# Patient Record
Sex: MALE | Race: WHITE | Employment: FULL TIME | URBAN - METROPOLITAN AREA
[De-identification: names, ages, dates, MRNs, and addresses within clinical notes are randomized per-mention and may not be internally consistent; named-entity substitution may affect disease eponyms.]

---

## 2017-08-01 ENCOUNTER — APPOINTMENT (OUTPATIENT)
Dept: GENERAL RADIOLOGY | Age: 42
DRG: 918 | End: 2017-08-01
Attending: EMERGENCY MEDICINE
Payer: COMMERCIAL

## 2017-08-01 ENCOUNTER — HOSPITAL ENCOUNTER (OUTPATIENT)
Age: 42
Discharge: LEFT BEFORE DISCHARGE | DRG: 918 | End: 2017-08-01
Attending: EMERGENCY MEDICINE | Admitting: EMERGENCY MEDICINE
Payer: COMMERCIAL

## 2017-08-01 VITALS
BODY MASS INDEX: 30.59 KG/M2 | OXYGEN SATURATION: 100 % | HEIGHT: 71 IN | RESPIRATION RATE: 18 BRPM | WEIGHT: 218.5 LBS | TEMPERATURE: 98.4 F | SYSTOLIC BLOOD PRESSURE: 116 MMHG | HEART RATE: 100 BPM | DIASTOLIC BLOOD PRESSURE: 89 MMHG

## 2017-08-01 DIAGNOSIS — R00.0 TACHYCARDIA: ICD-10-CM

## 2017-08-01 DIAGNOSIS — N19 RENAL FAILURE: ICD-10-CM

## 2017-08-01 DIAGNOSIS — T40.604A: Primary | ICD-10-CM

## 2017-08-01 PROBLEM — T40.601A OVERDOSE OF OPIATE OR RELATED NARCOTIC (HCC): Status: ACTIVE | Noted: 2017-08-01

## 2017-08-01 LAB
ALBUMIN SERPL BCP-MCNC: 4.4 G/DL (ref 3.4–5)
ALBUMIN/GLOB SERPL: 0.9 {RATIO} (ref 0.8–1.7)
ALP SERPL-CCNC: 56 U/L (ref 45–117)
ALT SERPL-CCNC: 74 U/L (ref 16–61)
AMPHET UR QL SCN: NEGATIVE
ANION GAP BLD CALC-SCNC: 9 MMOL/L (ref 3–18)
APAP SERPL-MCNC: <2 UG/ML (ref 10–30)
APPEARANCE UR: CLEAR
AST SERPL W P-5'-P-CCNC: 43 U/L (ref 15–37)
ATRIAL RATE: 133 BPM
ATRIAL RATE: 144 BPM
BACTERIA URNS QL MICRO: ABNORMAL /HPF
BARBITURATES UR QL SCN: NEGATIVE
BASOPHILS # BLD AUTO: 0 K/UL (ref 0–0.1)
BASOPHILS # BLD: 0 % (ref 0–2)
BENZODIAZ UR QL: POSITIVE
BILIRUB SERPL-MCNC: 0.5 MG/DL (ref 0.2–1)
BILIRUB UR QL: NEGATIVE
BUN SERPL-MCNC: 13 MG/DL (ref 7–18)
BUN/CREAT SERPL: 8 (ref 12–20)
CALCIUM SERPL-MCNC: 9.1 MG/DL (ref 8.5–10.1)
CALCULATED P AXIS, ECG09: 46 DEGREES
CALCULATED R AXIS, ECG10: -31 DEGREES
CALCULATED R AXIS, ECG10: -61 DEGREES
CALCULATED T AXIS, ECG11: 27 DEGREES
CALCULATED T AXIS, ECG11: 63 DEGREES
CANNABINOIDS UR QL SCN: POSITIVE
CHLORIDE SERPL-SCNC: 103 MMOL/L (ref 100–108)
CO2 SERPL-SCNC: 27 MMOL/L (ref 21–32)
COCAINE UR QL SCN: NEGATIVE
COLOR UR: YELLOW
CREAT SERPL-MCNC: 1.63 MG/DL (ref 0.6–1.3)
DIAGNOSIS, 93000: NORMAL
DIAGNOSIS, 93000: NORMAL
DIFFERENTIAL METHOD BLD: ABNORMAL
EOSINOPHIL # BLD: 0.1 K/UL (ref 0–0.4)
EOSINOPHIL NFR BLD: 1 % (ref 0–5)
EPITH CASTS URNS QL MICRO: ABNORMAL /LPF (ref 0–5)
ERYTHROCYTE [DISTWIDTH] IN BLOOD BY AUTOMATED COUNT: 12.8 % (ref 11.6–14.5)
ETHANOL SERPL-MCNC: 89 MG/DL (ref 0–3)
GLOBULIN SER CALC-MCNC: 4.7 G/DL (ref 2–4)
GLUCOSE SERPL-MCNC: 138 MG/DL (ref 74–99)
GLUCOSE UR STRIP.AUTO-MCNC: NEGATIVE MG/DL
HCT VFR BLD AUTO: 46.2 % (ref 36–48)
HDSCOM,HDSCOM: ABNORMAL
HGB BLD-MCNC: 15.7 G/DL (ref 13–16)
HGB UR QL STRIP: ABNORMAL
KETONES UR QL STRIP.AUTO: NEGATIVE MG/DL
LACTATE BLD-SCNC: 2.4 MMOL/L (ref 0.4–2)
LEUKOCYTE ESTERASE UR QL STRIP.AUTO: NEGATIVE
LYMPHOCYTES # BLD AUTO: 29 % (ref 21–52)
LYMPHOCYTES # BLD: 3.8 K/UL (ref 0.9–3.6)
MCH RBC QN AUTO: 28.6 PG (ref 24–34)
MCHC RBC AUTO-ENTMCNC: 34 G/DL (ref 31–37)
MCV RBC AUTO: 84.3 FL (ref 74–97)
METHADONE UR QL: NEGATIVE
MONOCYTES # BLD: 0.5 K/UL (ref 0.05–1.2)
MONOCYTES NFR BLD AUTO: 4 % (ref 3–10)
NEUTS SEG # BLD: 8.5 K/UL (ref 1.8–8)
NEUTS SEG NFR BLD AUTO: 66 % (ref 40–73)
NITRITE UR QL STRIP.AUTO: NEGATIVE
OPIATES UR QL: POSITIVE
P-R INTERVAL, ECG05: 146 MS
PCP UR QL: NEGATIVE
PH UR STRIP: 7 [PH] (ref 5–8)
PLATELET # BLD AUTO: 269 K/UL (ref 135–420)
PMV BLD AUTO: 9.7 FL (ref 9.2–11.8)
POTASSIUM SERPL-SCNC: 4 MMOL/L (ref 3.5–5.5)
PROT SERPL-MCNC: 9.1 G/DL (ref 6.4–8.2)
PROT UR STRIP-MCNC: 30 MG/DL
Q-T INTERVAL, ECG07: 294 MS
Q-T INTERVAL, ECG07: 312 MS
QRS DURATION, ECG06: 88 MS
QRS DURATION, ECG06: 98 MS
QTC CALCULATION (BEZET), ECG08: 437 MS
QTC CALCULATION (BEZET), ECG08: 527 MS
RBC # BLD AUTO: 5.48 M/UL (ref 4.7–5.5)
RBC #/AREA URNS HPF: ABNORMAL /HPF (ref 0–5)
SALICYLATES SERPL-MCNC: <2.8 MG/DL (ref 2.8–20)
SODIUM SERPL-SCNC: 139 MMOL/L (ref 136–145)
SP GR UR REFRACTOMETRY: 1.01 (ref 1–1.03)
TROPONIN I SERPL-MCNC: <0.02 NG/ML (ref 0–0.04)
UROBILINOGEN UR QL STRIP.AUTO: 0.2 EU/DL (ref 0.2–1)
VENTRICULAR RATE, ECG03: 133 BPM
VENTRICULAR RATE, ECG03: 172 BPM
WBC # BLD AUTO: 12.9 K/UL (ref 4.6–13.2)
WBC URNS QL MICRO: NEGATIVE /HPF (ref 0–4)

## 2017-08-01 PROCEDURE — 96361 HYDRATE IV INFUSION ADD-ON: CPT

## 2017-08-01 PROCEDURE — 96374 THER/PROPH/DIAG INJ IV PUSH: CPT

## 2017-08-01 PROCEDURE — 81001 URINALYSIS AUTO W/SCOPE: CPT | Performed by: EMERGENCY MEDICINE

## 2017-08-01 PROCEDURE — 93005 ELECTROCARDIOGRAM TRACING: CPT

## 2017-08-01 PROCEDURE — 65270000029 HC RM PRIVATE

## 2017-08-01 PROCEDURE — 80307 DRUG TEST PRSMV CHEM ANLYZR: CPT | Performed by: EMERGENCY MEDICINE

## 2017-08-01 PROCEDURE — 71010 XR CHEST PORT: CPT

## 2017-08-01 PROCEDURE — 83605 ASSAY OF LACTIC ACID: CPT

## 2017-08-01 PROCEDURE — 85025 COMPLETE CBC W/AUTO DIFF WBC: CPT | Performed by: EMERGENCY MEDICINE

## 2017-08-01 PROCEDURE — 99285 EMERGENCY DEPT VISIT HI MDM: CPT

## 2017-08-01 PROCEDURE — 80053 COMPREHEN METABOLIC PANEL: CPT | Performed by: EMERGENCY MEDICINE

## 2017-08-01 PROCEDURE — 74011250636 HC RX REV CODE- 250/636: Performed by: EMERGENCY MEDICINE

## 2017-08-01 PROCEDURE — 84484 ASSAY OF TROPONIN QUANT: CPT | Performed by: EMERGENCY MEDICINE

## 2017-08-01 RX ORDER — NALOXONE HYDROCHLORIDE 1 MG/ML
INJECTION INTRAMUSCULAR; INTRAVENOUS; SUBCUTANEOUS
Status: DISCONTINUED
Start: 2017-08-01 | End: 2017-08-02 | Stop reason: HOSPADM

## 2017-08-01 RX ORDER — NALOXONE HYDROCHLORIDE 1 MG/ML
0.5 INJECTION INTRAMUSCULAR; INTRAVENOUS; SUBCUTANEOUS ONCE
Status: DISCONTINUED | OUTPATIENT
Start: 2017-08-01 | End: 2017-08-02 | Stop reason: HOSPADM

## 2017-08-01 RX ORDER — NALOXONE HYDROCHLORIDE 1 MG/ML
0.5 INJECTION INTRAMUSCULAR; INTRAVENOUS; SUBCUTANEOUS ONCE
Status: COMPLETED | OUTPATIENT
Start: 2017-08-01 | End: 2017-08-01

## 2017-08-01 RX ADMIN — SODIUM CHLORIDE 1000 ML: 900 INJECTION, SOLUTION INTRAVENOUS at 16:52

## 2017-08-01 RX ADMIN — NALOXONE HYDROCHLORIDE 0.5 MG: 1 INJECTION PARENTERAL at 15:40

## 2017-08-01 RX ADMIN — SODIUM CHLORIDE 1000 ML: 900 INJECTION, SOLUTION INTRAVENOUS at 15:52

## 2017-08-01 NOTE — ED NOTES
Bedside shift change report given to Tin Smallwood RN (oncoming nurse) by Etienne Gonzales RN (offgoing nurse). Report included the following information SBAR, ED Summary, Intake/Output, MAR, Recent Results and Cardiac Rhythm Sinus tach.

## 2017-08-01 NOTE — Clinical Note
Status[de-identified] Inpatient [101] Type of Bed: Medical [8] Inpatient Hospitalization Certified Necessary for the Following Reasons: 3. Patient receiving treatment that can only be provided in an inpatient setting (further clarification in H&P documentation) Admitting Diagnosis: Overdose of opiate or related narcotic [[id number]] Admitting Diagnosis: Renal failure [[id number]] Admitting Diagnosis: Tachycardia [[id number]] Admitting Physician: Chen Shi [[id number]] Attending Physician: Chen Shi [[id number]] Estimated Length of Stay: 2 Midnights Discharge Plan[de-identified] Home with Office Follow-up

## 2017-08-01 NOTE — LETTER
NOTIFICATION RETURN TO WORK / SCHOOL 
 
8/1/2017 9:09 PM 
 
Mr. Issa Jeffrey 
[address] [facility]. [address] [address] To Whom It May Concern: 
 
Issa Jeffrey is currently under the care of [facility]. He will return to work/school on: Aug 3, 2017 If there are questions or concerns please have the patient contact our office. Sincerely, No name on file.

## 2017-08-01 NOTE — ED PROVIDER NOTES
HPI Comments: 3:33 PM Carlyle Hendricks is a 37 y.o. male who presents to ED via EMS for evaluation after being found unresponsive in the 's seat of a vehicle. Per EMS the pt was seen by witnesses driving erratically in a [employer] parking lot. They states that they had to break the window to get to him and police had to forcefully remove him from the car because he became agitated. He admits to EtOH at lunch today, but denies illicit drug use. No other complaints, associated symptoms or modifying factors at this time. PCP: Arianna Gerardo., DO    The history is provided by the patient. No past medical history on file. No past surgical history on file. No family history on file. Social History     Social History    Marital status: SINGLE     Spouse name: N/A    Number of children: N/A    Years of education: N/A     Occupational History    Not on file. Social History Main Topics    Smoking status: Not on file    Smokeless tobacco: Not on file    Alcohol use Not on file    Drug use: Not on file    Sexual activity: Not on file     Other Topics Concern    Not on file     Social History Narrative         ALLERGIES: Review of patient's allergies indicates not on file. Review of Systems   Constitutional: Negative for chills, diaphoresis and fatigue. HENT: Negative for congestion, rhinorrhea and sore throat. Eyes: Negative for discharge and visual disturbance. Respiratory: Negative for cough and shortness of breath. Cardiovascular: Negative for chest pain and leg swelling. Gastrointestinal: Negative for abdominal pain, blood in stool, diarrhea, nausea and vomiting. Genitourinary: Negative for discharge, dysuria and hematuria. Musculoskeletal: Negative for arthralgias, myalgias and neck pain. Skin: Negative for rash. Neurological: Negative for dizziness, weakness, numbness and headaches. Psychiatric/Behavioral: Negative for confusion.        Vitals: 08/01/17 1533 08/01/17 1535   BP: (!) 164/132 (!) 164/132   Pulse: (!) 187 (!) 192   Resp: 30 30   Temp:  (!) 100.6 °F (38.1 °C)   SpO2: 96% 98%   Weight:  99.1 kg (218 lb 8 oz)   Height:  5' 11\" (1.803 m)            Physical Exam   Constitutional: Vital signs are normal. He appears well-developed and well-nourished. He does not appear ill. No distress. HENT:   Head: Atraumatic. Mouth/Throat: Uvula is midline, oropharynx is clear and moist and mucous membranes are normal. Mucous membranes are not dry. No trismus in the jaw. Normal dentition. Eyes: Pupils are equal, round, and reactive to light. Right conjunctiva is not injected. Left conjunctiva is not injected. No scleral icterus. Right eye exhibits normal extraocular motion and no nystagmus. Left eye exhibits normal extraocular motion and no nystagmus. No clonus   Neck: Trachea normal, full passive range of motion without pain and phonation normal. Neck supple. No tracheal deviation present. Cardiovascular: Regular rhythm, S1 normal, S2 normal and normal pulses. No extrasystoles are present. Tachycardia present. No murmur heard. Pulses:       Radial pulses are 2+ on the right side, and 2+ on the left side. Prominent PMI   Pulmonary/Chest: No accessory muscle usage or stridor. No tachypnea. No respiratory distress. He has no decreased breath sounds. He has no wheezes. He has no rhonchi. He has no rales. Abdominal: Soft. He exhibits no distension and no ascites. There is no hepatosplenomegaly. There is no tenderness. There is no rebound, no guarding and no CVA tenderness. Musculoskeletal: He exhibits no edema or deformity. No rigidity to muscles  Moves all extremities   Lymphadenopathy:     He has no cervical adenopathy. Neurological: He is alert. No cranial nerve deficit.    Initially unresponsive with intermittent periods of apnea, but responsive to voice  GCS on arrival was 13, improved to 15 after administration of Narcan  Fully oriented after Narcan   Skin: Skin is warm. No rash noted. He is diaphoretic. No cyanosis. No pallor. Psychiatric: He has a normal mood and affect. He is not actively hallucinating. Thought content is not paranoid and not delusional. Cognition and memory are not impaired. MDM  Number of Diagnoses or Management Options  Diagnosis management comments: Mr. Higinio Woo is a 37year old male who presents to the ED via EMS for decreased responsiveness. Was reportedly found inside of his car in a parking lot and the window to the car needed to be broken open. No naloxone given prior to arrival. In the ED was initially somnolent and required 0.5 mg IV naloxone for improvement in mental status. He adamantly denies any ingestions. Notable that on arrival he was tachycardic to around 190 - initial EKG was unclear if he was in SVT or sinus tachycardia, but repeat after IV fluids initiated showed heart rate of 150 in sinus tachycardia. Lactate slightly elevated to 2.4. Slight temp elevation to 100.6F. No clear infectious source noted. No signs of trauma. Patient was originally diaphoretic and but had no clonus, nystagmus, or lead pipe rigidity. Admitted to drinking alcohol today at lunch. No clear evidence of TCA overdose, serotonic syndrome, or malignant hyperthermia. Considered amphetamine or cocaine ingestion. Most likely has a mixed ingestion with opiates and some other unidentified substance. May require admission for observation. Considered administration of benzodiazepines for his tachycardia and and agitation but am holding off due to his initial somnolence. Doubt he is septic. No evidence of trauma. ED Course       Procedures       Vitals:  Patient Vitals for the past 12 hrs:   Temp Pulse Resp BP SpO2   08/01/17 1535 (!) 100.6 °F (38.1 °C) (!) 192 30 (!) 164/132 98 %   08/01/17 1533 - (!) 187 30 (!) 164/132 96 %   Patient is 98% O2 on RA, indicating adequate oxygenation.        Medications ordered:   Medications   naloxone (NARCAN) 1 mg/mL injection (not administered)   naloxone (NARCAN) injection 0.5 mg (not administered)   naloxone (NARCAN) injection 0.5 mg (not administered)   sodium chloride 0.9 % bolus infusion 1,000 mL (not administered)         Lab findings:  Labs Reviewed   CBC WITH AUTOMATED DIFF - Abnormal; Notable for the following:        Result Value    ABS. NEUTROPHILS 8.5 (*)     ABS.  LYMPHOCYTES 3.8 (*)     All other components within normal limits   METABOLIC PANEL, COMPREHENSIVE - Abnormal; Notable for the following:     Glucose 138 (*)     Creatinine 1.63 (*)     BUN/Creatinine ratio 8 (*)     GFR est AA 57 (*)     GFR est non-AA 47 (*)     ALT (SGPT) 74 (*)     AST (SGOT) 43 (*)     Protein, total 9.1 (*)     Globulin 4.7 (*)     All other components within normal limits   URINALYSIS W/ RFLX MICROSCOPIC - Abnormal; Notable for the following:     Protein 30 (*)     Blood TRACE (*)     All other components within normal limits   DRUG SCREEN, URINE - Abnormal; Notable for the following:     BENZODIAZEPINE POSITIVE (*)     THC (TH-CANNABINOL) POSITIVE (*)     OPIATES POSITIVE (*)     All other components within normal limits   ACETAMINOPHEN - Abnormal; Notable for the following:     Acetaminophen level <2 (*)     All other components within normal limits   ETHYL ALCOHOL - Abnormal; Notable for the following:     ALCOHOL(ETHYL),SERUM 89 (*)     All other components within normal limits   SALICYLATE - Abnormal; Notable for the following:     SALICYLATE <4.5 (*)     All other components within normal limits   URINE MICROSCOPIC ONLY - Abnormal; Notable for the following:     Bacteria FEW (*)     All other components within normal limits   POC LACTIC ACID - Abnormal; Notable for the following:     Lactic Acid (POC) 2.4 (*)     All other components within normal limits   TROPONIN I   HEPATIC FUNCTION PANEL   POC VENOUS BLOOD GAS       EKG interpretation by ED Physician:  [id number]: Rate of 172; LAD; No ST elevation or depression; Normal QRS; Due to artifact unable to tell if SVT or sinus tachycardia is underlying rhythm    1658: Sinus tachycardia, Rate 108; LAD; Normal intervals; Normal QRS and ST segments      X-Ray, CT or other radiology findings or impressions:  XR CHEST PORT      As read by myself: No acute process. No cardiomegaly. No infiltrates. No pulmonary edema. Progress notes, Consult notes or additional Procedure notes:   Re-evaluated patient. HR improved after first bolus of normal saline. Still tachycardic. No longer diaphoretic. Will bolus 2nd liter of fluid and reassess but probably requires admit given his persistent abnormal vital signs    6:16 PM Consult:  Discussed care with Dr. Darshana Velasquez, Hospitalist. Standard discussion; including history of patients chief complaint, available diagnostic results, and treatment course. He agrees to admit. 6:45 PM Pt rechecked. Discussed the need and reasons for admission with the pt. He would like to be discharged home if possible. I asked if the person who is coming to pick him up will come inside and have a conversation with me about him leaving AMA. Discussed the risks of leaving AMA with the pt, including death, serious illness, and disability. He acknowledges and still wishes to go home. He will call his spouse to give him a ride and I will speak with her about the risks when she arrives. Pt is currently awake, alert, in no distress, heart rate 125 bpm, and no respiratory distress. Pt states that all he remembers is dropping a friend off at work and his car window cracking. He reports that he is much improved from when he arrived. Disposition:  Diagnosis:   1. Overdose of opiate or related narcotic, undetermined intent, initial encounter    2. Tachycardia    3.  Renal failure        Disposition: DAMARISA    Spoke with patient and his significant other in person - reviewed his medical course, diagnosis, and pertinent vital signs (tachycardia, low grade fever), positive urine drug screen results, elevated creatinine level, and detectable alcohol level. Discussed risks of discharge including risk of death, worsening medical condition, and disability. Patient expressed understanding. Reiterated recommendation for admission to identify his underlying medical condition. Patient continues to refuse admission. Significant other present in the ED and will drive him home. Advised that he see a provider tomorrow for re-assessment. Encouraged to come back to this ED or any other emergency department or urgent care. Provided patient with paperwork for how to establish primary care. Scribe Attestation:   Jayesh Samaniego acting as a scribe for and in the presence of Swapna Gaines MD August 01, 2017 at 3:40 PM     Signed by: Corie Amin, August 01, 2017 at 3:40 PM     Provider Attestation:   I personally performed the services described in the documentation, reviewed the documentation, as recorded by the scribe in my presence, and it accurately and completely records my words and actions.      Reviewed and signed by:  Swapna Gaines MD

## 2017-08-01 NOTE — ED TRIAGE NOTES
Pt arrives via EMS in police custody after being found unresponsive at [facility]. EMS report that pt was parked parallel in the middle of the parking lot and was not responsive after EMS broke window to vehicle. Pt drowsy and minimally responsive upon arrival. Pt tachycardiac at 180. MD is at the bedside.

## 2017-08-01 NOTE — PROGRESS NOTES
SPoke with the patient. He does not wish to be admitted at this time. Spoke with the ED physician regarding it. Please call with any questions or if patient changes his mind.

## 2017-08-02 LAB
ATRIAL RATE: 108 BPM
CALCULATED P AXIS, ECG09: 65 DEGREES
CALCULATED R AXIS, ECG10: -33 DEGREES
CALCULATED T AXIS, ECG11: 38 DEGREES
DIAGNOSIS, 93000: NORMAL
P-R INTERVAL, ECG05: 146 MS
Q-T INTERVAL, ECG07: 310 MS
QRS DURATION, ECG06: 98 MS
QTC CALCULATION (BEZET), ECG08: 415 MS
VENTRICULAR RATE, ECG03: 108 BPM

## 2017-08-02 NOTE — DISCHARGE INSTRUCTIONS
Accidental Overdose of Medicine: Care Instructions  Your Care Instructions  Almost any medicine can cause harm if you take too much of it. You have had treatment to help your body get rid of an overdose of a medicine. This may have been an over-the-counter medicine. Or it might be one that a doctor prescribed. It may even have been a vitamin or a supplement. During treatment, the doctor may have given you fluids and medicine. You also may have had lab tests. Then the doctor made sure that you were well enough to go home. The doctor has checked you carefully, but problems can develop later. If you notice any problems or new symptoms, get medical treatment right away. Follow-up care is a key part of your treatment and safety. Be sure to make and go to all appointments, and call your doctor if you are having problems. It's also a good idea to know your test results and keep a list of the medicines you take. How can you care for yourself at home? Home care  · Drink plenty of fluids. If you have kidney, heart, or liver disease and have to limit fluids, talk with your doctor before you increase the amount of fluids you drink. · If you normally take medicines, ask your doctor when you can start taking them again. · Read the information that comes with any medicine. If you have questions, ask your doctor or pharmacist.  Prevention  · Be safe with medicines. Take your medicines exactly as prescribed or as the label directs. Call your doctor if you think you are having a problem with your medicine. · Keep your medicines in the containers they came in. Keep them with the original labels. · Find out what to do if you miss a dose of your medicine. When should you call for help? Call 911 anytime you think you may need emergency care. For example, call if:  · You passed out (lost consciousness). · You have trouble breathing. · You are sleepy or hard to wake up.   Call your doctor now or seek immediate medical care if:  · You are vomiting. · You have a new or worse headache. · You are dizzy or lightheaded or feel like you may faint. Watch closely for changes in your health, and be sure to contact your doctor if:  · You do not get better as expected. Where can you learn more? Go to http://alhaji-lashanda.info/. Enter C165 in the search box to learn more about \"Accidental Overdose of Medicine: Care Instructions. \"  Current as of: March 24, 2017  Content Version: 11.3  © 4650-2035 [employer]. Care instructions adapted under license by [employer] (which disclaims liability or warranty for this information). If you have questions about a medical condition or this instruction, always ask your healthcare professional. [address] any warranty or liability for your use of this information. Alcohol, Drug, or Poison Ingestion: Care Instructions  Your Care Instructions  A person can become very sick, or die, from swallowing or using alcohol, drugs, or poisons. Alcohol poisoning occurs when a person drinks a large amount of alcohol. Alcohol can stop nerve signals that control breathing. It can also stop the gag reflex that prevents choking. Alcohol poisoning is serious. It can lead to brain damage or death if it's not treated right away. Drugs can be used by accident or on purpose. They can be swallowed, inhaled, injected, or absorbed through the skin. Drugs include over-the-counter medicine (such as aspirin or acetaminophen) and prescription medicine. They also include vitamins and supplements. And they include illegal drugs such as cocaine and heroin. And poisons are all around us. They include household , cosmetics, houseplants, and garden chemicals. The doctor has checked you carefully, but problems can develop later. If you notice any problems or new symptoms, get medical treatment right away.   Follow-up care is a key part of your treatment and safety. Be sure to make and go to all appointments, and call your doctor if you are having problems. It's also a good idea to know your test results and keep a list of the medicines you take. How can you care for yourself at home? Alcohol problems  · Talk to your doctor or counselor about programs that can help you stop using alcohol. · Plan ways to avoid being tempted to drink. ¨ Get rid of all alcohol in your home. ¨ Avoid places where you tend to drink. ¨ Stay away from places or events that offer alcohol. ¨ Stay away from people who drink a lot. Drug problems  · Talk to your doctor about programs that can help you stop using drugs. · Get rid of any drugs you might be tempted to misuse. · Learn how to say no when other people use drugs. · Don't spend time with people who use drugs. Poison prevention  · Keep products in the containers they came in. Keep them with the original labels. · Be careful when you use cleaning products, paints, solvents, and pesticides. Read labels before use. Use a fan to move strong odors and fumes out of your home. · Do not mix cleaning products. Try to use nontoxic . These include vinegar, lemon juice, and baking soda. When should you call for help? Poison control centers, hospitals, or your doctor can give immediate advice in the case of a poisoning. The [facility] number is [phone]. Have the poison container with you so you can give complete information to the poison control center, such as what the poison or substance is, how much was taken and when. Do not try to make the person vomit. Call 911 anytime you think you may need emergency care. For example, call if you or someone else:  · Has used or currently uses alcohol or drugs and is very confused or can't stay awake. · Has passed out (lost consciousness). · Has severe trouble breathing. · Is having a seizure.   Call your doctor now or seek immediate medical care if you or someone else:  · Has new symptoms, or is not acting normally. Watch closely for changes in your health, and be sure to contact your doctor if:  · You do not get better as expected. · You need help with drug or alcohol problems. · You have problems with depression or other mental health issues. Where can you learn more? Go to http://alhaji-lashanda.info/. Enter J796 in the search box to learn more about \"Alcohol, Drug, or Poison Ingestion: Care Instructions. \"  Current as of: March 20, 2017  Content Version: 11.3  © 8619-5467 [employer]. Care instructions adapted under license by [employer] (which disclaims liability or warranty for this information). If you have questions about a medical condition or this instruction, always ask your healthcare professional. [address] any warranty or liability for your use of this information.

## 2018-08-07 ENCOUNTER — HOSPITAL ENCOUNTER (OUTPATIENT)
Dept: PHYSICAL THERAPY | Age: 43
End: 2018-08-07

## 2018-08-09 ENCOUNTER — APPOINTMENT (OUTPATIENT)
Dept: PHYSICAL THERAPY | Age: 43
End: 2018-08-09

## 2019-07-07 ENCOUNTER — APPOINTMENT (OUTPATIENT)
Dept: CT IMAGING | Age: 44
End: 2019-07-07
Attending: EMERGENCY MEDICINE
Payer: COMMERCIAL

## 2019-07-07 ENCOUNTER — APPOINTMENT (OUTPATIENT)
Dept: GENERAL RADIOLOGY | Age: 44
End: 2019-07-07
Attending: EMERGENCY MEDICINE
Payer: COMMERCIAL

## 2019-07-07 ENCOUNTER — HOSPITAL ENCOUNTER (EMERGENCY)
Age: 44
Discharge: HOME | End: 2019-07-07
Attending: EMERGENCY MEDICINE
Payer: COMMERCIAL

## 2019-07-07 VITALS
RESPIRATION RATE: 18 BRPM | OXYGEN SATURATION: 100 % | HEART RATE: 125 BPM | SYSTOLIC BLOOD PRESSURE: 158 MMHG | DIASTOLIC BLOOD PRESSURE: 91 MMHG | TEMPERATURE: 101.2 F | BODY MASS INDEX: 30.52 KG/M2 | WEIGHT: 218 LBS | HEIGHT: 71 IN

## 2019-07-07 DIAGNOSIS — F11.10 OPIATE ABUSE, EPISODIC (HCC): Primary | ICD-10-CM

## 2019-07-07 LAB
AMPHET UR QL SCN: NEGATIVE
ANION GAP SERPL CALC-SCNC: 7 MMOL/L (ref 3–18)
APPEARANCE UR: ABNORMAL
BACTERIA URNS QL MICRO: ABNORMAL /HPF
BARBITURATES UR QL SCN: NEGATIVE
BASOPHILS # BLD: 0 K/UL (ref 0–0.1)
BASOPHILS NFR BLD: 0 % (ref 0–2)
BENZODIAZ UR QL: NEGATIVE
BILIRUB UR QL: NEGATIVE
BUN SERPL-MCNC: 22 MG/DL (ref 7–18)
BUN/CREAT SERPL: 10 (ref 12–20)
CALCIUM SERPL-MCNC: 9.5 MG/DL (ref 8.5–10.1)
CANNABINOIDS UR QL SCN: POSITIVE
CAOX CRY URNS QL MICRO: ABNORMAL
CHLORIDE SERPL-SCNC: 109 MMOL/L (ref 100–108)
CK MB CFR SERPL CALC: 0.5 % (ref 0–4)
CK MB SERPL-MCNC: 3.9 NG/ML (ref 5–25)
CK SERPL-CCNC: 803 U/L (ref 39–308)
CO2 SERPL-SCNC: 27 MMOL/L (ref 21–32)
COCAINE UR QL SCN: NEGATIVE
COLOR UR: ABNORMAL
CREAT SERPL-MCNC: 2.19 MG/DL (ref 0.6–1.3)
DIFFERENTIAL METHOD BLD: NORMAL
EOSINOPHIL # BLD: 0.1 K/UL (ref 0–0.4)
EOSINOPHIL NFR BLD: 1 % (ref 0–5)
EPITH CASTS URNS QL MICRO: ABNORMAL /LPF (ref 0–5)
ERYTHROCYTE [DISTWIDTH] IN BLOOD BY AUTOMATED COUNT: 12.9 % (ref 11.6–14.5)
ETHANOL SERPL-MCNC: <3 MG/DL (ref 0–3)
GLUCOSE SERPL-MCNC: 174 MG/DL (ref 74–99)
GLUCOSE UR STRIP.AUTO-MCNC: NEGATIVE MG/DL
HCT VFR BLD AUTO: 43.6 % (ref 36–48)
HDSCOM,HDSCOM: ABNORMAL
HGB BLD-MCNC: 15.2 G/DL (ref 13–16)
HGB UR QL STRIP: ABNORMAL
HYALINE CASTS URNS QL MICRO: ABNORMAL /LPF (ref 0–2)
KETONES UR QL STRIP.AUTO: ABNORMAL MG/DL
LACTATE BLD-SCNC: 3.14 MMOL/L (ref 0.4–2)
LEUKOCYTE ESTERASE UR QL STRIP.AUTO: NEGATIVE
LYMPHOCYTES # BLD: 2.6 K/UL (ref 0.9–3.6)
LYMPHOCYTES NFR BLD: 32 % (ref 21–52)
MCH RBC QN AUTO: 29.7 PG (ref 24–34)
MCHC RBC AUTO-ENTMCNC: 34.9 G/DL (ref 31–37)
MCV RBC AUTO: 85.2 FL (ref 74–97)
METHADONE UR QL: NEGATIVE
MONOCYTES # BLD: 0.4 K/UL (ref 0.05–1.2)
MONOCYTES NFR BLD: 4 % (ref 3–10)
MUCOUS THREADS URNS QL MICRO: ABNORMAL /LPF
NEUTS SEG # BLD: 5.2 K/UL (ref 1.8–8)
NEUTS SEG NFR BLD: 63 % (ref 40–73)
NITRITE UR QL STRIP.AUTO: NEGATIVE
OPIATES UR QL: POSITIVE
PCP UR QL: NEGATIVE
PH UR STRIP: 5.5 [PH] (ref 5–8)
PLATELET # BLD AUTO: 285 K/UL (ref 135–420)
PMV BLD AUTO: 9.5 FL (ref 9.2–11.8)
POTASSIUM SERPL-SCNC: 3.9 MMOL/L (ref 3.5–5.5)
PROT UR STRIP-MCNC: 100 MG/DL
RBC # BLD AUTO: 5.12 M/UL (ref 4.7–5.5)
RBC #/AREA URNS HPF: ABNORMAL /HPF (ref 0–5)
SODIUM SERPL-SCNC: 143 MMOL/L (ref 136–145)
SP GR UR REFRACTOMETRY: >1.03 (ref 1–1.03)
TROPONIN I SERPL-MCNC: <0.02 NG/ML (ref 0–0.04)
UROBILINOGEN UR QL STRIP.AUTO: 1 EU/DL (ref 0.2–1)
WBC # BLD AUTO: 8.2 K/UL (ref 4.6–13.2)
WBC URNS QL MICRO: ABNORMAL /HPF (ref 0–4)

## 2019-07-07 PROCEDURE — 81001 URINALYSIS AUTO W/SCOPE: CPT

## 2019-07-07 PROCEDURE — 70450 CT HEAD/BRAIN W/O DYE: CPT

## 2019-07-07 PROCEDURE — 80307 DRUG TEST PRSMV CHEM ANLYZR: CPT

## 2019-07-07 PROCEDURE — 99285 EMERGENCY DEPT VISIT HI MDM: CPT

## 2019-07-07 PROCEDURE — 96375 TX/PRO/DX INJ NEW DRUG ADDON: CPT

## 2019-07-07 PROCEDURE — 83605 ASSAY OF LACTIC ACID: CPT

## 2019-07-07 PROCEDURE — 93005 ELECTROCARDIOGRAM TRACING: CPT

## 2019-07-07 PROCEDURE — 85025 COMPLETE CBC W/AUTO DIFF WBC: CPT

## 2019-07-07 PROCEDURE — 80048 BASIC METABOLIC PNL TOTAL CA: CPT

## 2019-07-07 PROCEDURE — 87040 BLOOD CULTURE FOR BACTERIA: CPT

## 2019-07-07 PROCEDURE — 74011000250 HC RX REV CODE- 250: Performed by: EMERGENCY MEDICINE

## 2019-07-07 PROCEDURE — 71045 X-RAY EXAM CHEST 1 VIEW: CPT

## 2019-07-07 PROCEDURE — 96365 THER/PROPH/DIAG IV INF INIT: CPT

## 2019-07-07 PROCEDURE — 74011250636 HC RX REV CODE- 250/636: Performed by: EMERGENCY MEDICINE

## 2019-07-07 PROCEDURE — 82550 ASSAY OF CK (CPK): CPT

## 2019-07-07 RX ORDER — SODIUM CHLORIDE 0.9 % (FLUSH) 0.9 %
5-10 SYRINGE (ML) INJECTION AS NEEDED
Status: DISCONTINUED | OUTPATIENT
Start: 2019-07-07 | End: 2019-07-07 | Stop reason: HOSPADM

## 2019-07-07 RX ORDER — NALOXONE HYDROCHLORIDE 0.4 MG/ML
0.4 INJECTION, SOLUTION INTRAMUSCULAR; INTRAVENOUS; SUBCUTANEOUS ONCE
Status: COMPLETED | OUTPATIENT
Start: 2019-07-07 | End: 2019-07-07

## 2019-07-07 RX ORDER — VANCOMYCIN/0.9 % SOD CHLORIDE 1 G/100 ML
1000 PLASTIC BAG, INJECTION (ML) INTRAVENOUS ONCE
Status: DISCONTINUED | OUTPATIENT
Start: 2019-07-07 | End: 2019-07-07

## 2019-07-07 RX ADMIN — SODIUM CHLORIDE 1000 ML: 900 INJECTION, SOLUTION INTRAVENOUS at 16:17

## 2019-07-07 RX ADMIN — CEFTRIAXONE SODIUM 1 G: 1 INJECTION, POWDER, FOR SOLUTION INTRAMUSCULAR; INTRAVENOUS at 16:10

## 2019-07-07 RX ADMIN — VANCOMYCIN HYDROCHLORIDE 1000 MG: 10 INJECTION, POWDER, LYOPHILIZED, FOR SOLUTION INTRAVENOUS at 16:22

## 2019-07-07 RX ADMIN — NALXONE HYDROCHLORIDE 0.4 MG: 0.4 INJECTION INTRAMUSCULAR; INTRAVENOUS; SUBCUTANEOUS at 16:06

## 2019-07-07 NOTE — DISCHARGE INSTRUCTIONS
Patient Education        Misuse of Multiple Drugs: Care Instructions  Your Care Instructions    You have had treatment to help your body get rid of a combination of any of these drugs:  · Prescription medicines  · Over-the-counter medicines  · Alcohol  · Illegal drugs  Taking some drugs together may cause a bad reaction. They can have unexpected or stronger effects on your body and mind. For example, benzodiazepines (such as alprazolam and lorazepam) and alcohol both depress the nervous system. Taken together, each one is stronger than when it is taken by itself. You are getting better, but it takes time for the drugs to leave your body. It may take up to 2 weeks for your mind to clear and your mood to improve. Depending on the drugs you took, the doctor might have:  · Watched your symptoms or done tests to find out what drugs were in your body. · Treated you to control your breathing, blood pressure, and heart rate. · Tried to remove the drugs from your body by pumping your stomach or giving you a substance by mouth that absorbs chemicals. · Given you a substance that neutralizes chemicals (antidote). · Given you oxygen to help you breathe. · Given you fluids. The doctor also watched you carefully to make sure you were recovering safely. Follow-up care is a key part of your treatment and safety. Be sure to make and go to all appointments, and call your doctor if you are having problems. It's also a good idea to know your test results and keep a list of the medicines you take. How can you care for yourself at home? · When you take substances like alcohol and some drugs regularly, your body gets used to them. This is called dependency. If you are dependent on them, you may have withdrawal symptoms when you stop taking them. These symptoms may include trembling, feeling restless, and sweating. To help get past these:  ? Get plenty of rest.  ? Drink lots of fluids. ? Stay active.   ? Eat a healthy diet.  · If you had a tube in your throat to help you breathe, you may have a sore throat or hoarseness that can last a few days. Drinking fluids may help soothe your throat. Get help to stop using drugs. Talk to your doctor about drug and alcohol treatment programs. When should you call for help? Call 911 anytime you think you may need emergency care. For example, call if:    · You feel you cannot stop from hurting yourself or someone else.   [facility] your doctor now or seek immediate medical care if:    · You have new or worse symptoms of withdrawal, such as trembling, feeling restless, and sweating, that you can't manage at home.    Watch closely for changes in your health, and be sure to contact your doctor if:    · You do not get better as expected.     · You need help finding the right place to get help with drug or alcohol problems. Where can you learn more? Go to http://alhaji-lashanda.info/. Enter B109 in the search box to learn more about \"Misuse of Multiple Drugs: Care Instructions. \"  Current as of: May 7, 2018  Content Version: 11.9  © 0937-1057 [employer]. Care instructions adapted under license by [employer] (which disclaims liability or warranty for this information). If you have questions about a medical condition or this instruction, always ask your healthcare professional. [address] any warranty or liability for your use of this information.

## 2019-07-07 NOTE — ED PROVIDER NOTES
EMERGENCY DEPARTMENT HISTORY AND PHYSICAL EXAM    4:03 PM      Date: 7/7/2019  Patient Name: Chula Sharma    History of Presenting Illness     Chief Complaint   Patient presents with    Altered mental status         History Provided By: EMS    Additional History (Context): Chula Sharma is a 44 y.o. male with No significant past medical history who presents with via EMS diaphoretic and wandering around the parking lot. Patient very paranoid and anxious, restless. Patient denies taking any recreational drug. Patient denies any past medical history denies any smoking occasional alcohol and no drug use. PCP: None      Current Facility-Administered Medications   Medication Dose Route Frequency Provider Last Rate Last Dose    sodium chloride (NS) flush 5-10 mL  5-10 mL IntraVENous PRN Nadege Lockwood DO        cefTRIAXone (ROCEPHIN) 1 g in sterile water (preservative free) 10 mL IV syringe  1 g IntraVENous Q24H Michael Lockwood DO   1 g at 07/07/19 1610    sodium chloride 0.9 % bolus infusion 1,000 mL  1,000 mL IntraVENous ONCE Michael Lockwood DO        Followed by   Ayaan Bansal sodium chloride 0.9 % bolus infusion 970 mL  970 mL IntraVENous ONCE Nadege Lockwood DO           Past History     Past Medical History:  No past medical history on file. Past Surgical History:  No past surgical history on file. Family History:  No family history on file. Social History:  Social History     Tobacco Use    Smoking status: Not on file   Substance Use Topics    Alcohol use: Not on file    Drug use: Not on file       Allergies:  No Known Allergies      Review of Systems       Review of Systems   Unable to perform ROS: Acuity of condition         Physical Exam     Visit Vitals  BP (!) 158/91   Pulse (!) 125   Temp (!) 101.2 °F (38.4 °C)   Resp 18   Ht 5' 11\" (1.803 m)   Wt 98.9 kg (218 lb)   SpO2 100%   BMI 30.40 kg/m²         Physical Exam   Constitutional: He appears well-developed and well-nourished.  He is cooperative. He appears ill. He appears distressed. HENT:   Head: Normocephalic and atraumatic. Eyes: Conjunctivae are normal.   Pupils pinpoint nonreactive   Neck: Trachea normal. Neck supple. Cardiovascular: Regular rhythm. Tachycardia present. Exam reveals no gallop. No murmur heard. Pulmonary/Chest: Effort normal and breath sounds normal. No accessory muscle usage. No respiratory distress. Abdominal: Soft. Bowel sounds are normal.   Musculoskeletal: He exhibits no edema or tenderness. Strength 5/5 throughout. Neurological: He is alert. He displays atrophy. He exhibits abnormal muscle tone. Coordination abnormal. Gait normal. GCS eye subscore is 4. GCS verbal subscore is 4. GCS motor subscore is 6. Skin: He is diaphoretic. Nursing note and vitals reviewed. Diagnostic Study Results     Labs -  Recent Results (from the past 12 hour(s))   CBC WITH AUTOMATED DIFF    Collection Time: 07/07/19  3:30 PM   Result Value Ref Range    WBC 8.2 4.6 - 13.2 K/uL    RBC 5.12 4.70 - 5.50 M/uL    HGB 15.2 13.0 - 16.0 g/dL    HCT 43.6 36.0 - 48.0 %    MCV 85.2 74.0 - 97.0 FL    MCH 29.7 24.0 - 34.0 PG    MCHC 34.9 31.0 - 37.0 g/dL    RDW 12.9 11.6 - 14.5 %    PLATELET 050 605 - 734 K/uL    MPV 9.5 9.2 - 11.8 FL    NEUTROPHILS 63 40 - 73 %    LYMPHOCYTES 32 21 - 52 %    MONOCYTES 4 3 - 10 %    EOSINOPHILS 1 0 - 5 %    BASOPHILS 0 0 - 2 %    ABS. NEUTROPHILS 5.2 1.8 - 8.0 K/UL    ABS. LYMPHOCYTES 2.6 0.9 - 3.6 K/UL    ABS. MONOCYTES 0.4 0.05 - 1.2 K/UL    ABS. EOSINOPHILS 0.1 0.0 - 0.4 K/UL    ABS.  BASOPHILS 0.0 0.0 - 0.1 K/UL    DF AUTOMATED     METABOLIC PANEL, BASIC    Collection Time: 07/07/19  3:30 PM   Result Value Ref Range    Sodium 143 136 - 145 mmol/L    Potassium 3.9 3.5 - 5.5 mmol/L    Chloride 109 (H) 100 - 108 mmol/L    CO2 27 21 - 32 mmol/L    Anion gap 7 3.0 - 18 mmol/L    Glucose 174 (H) 74 - 99 mg/dL    BUN 22 (H) 7.0 - 18 MG/DL    Creatinine 2.19 (H) 0.6 - 1.3 MG/DL    BUN/Creatinine ratio 10 (L) 12 - 20      GFR est AA 40 (L) >60 ml/min/1.73m2    GFR est non-AA 33 (L) >60 ml/min/1.73m2    Calcium 9.5 8.5 - 10.1 MG/DL   CARDIAC PANEL,(CK, CKMB & TROPONIN)    Collection Time: 07/07/19  3:30 PM   Result Value Ref Range     (H) 39 - 308 U/L    CK - MB 3.9 (H) <3.6 ng/ml    CK-MB Index 0.5 0.0 - 4.0 %    Troponin-I, QT <0.02 0.0 - 0.045 NG/ML   ETHYL ALCOHOL    Collection Time: 07/07/19  3:30 PM   Result Value Ref Range    ALCOHOL(ETHYL),SERUM <3 0 - 3 MG/DL   POC LACTIC ACID    Collection Time: 07/07/19  3:44 PM   Result Value Ref Range    Lactic Acid (POC) 3.14 (HH) 0.40 - 2.00 mmol/L   EKG, 12 LEAD, INITIAL    Collection Time: 07/07/19  3:55 PM   Result Value Ref Range    Ventricular Rate 133 BPM    Atrial Rate 133 BPM    P-R Interval 144 ms    QRS Duration 98 ms    Q-T Interval 286 ms    QTC Calculation (Bezet) 425 ms    Calculated P Axis 74 degrees    Calculated R Axis -45 degrees    Calculated T Axis 59 degrees    Diagnosis       Sinus tachycardia  Possible Left atrial enlargement  Incomplete right bundle branch block  Left anterior fascicular block  Abnormal ECG  When compared with ECG of 01-AUG-2017 16:58,  Incomplete right bundle branch block is now present     URINALYSIS W/ RFLX MICROSCOPIC    Collection Time: 07/07/19  4:12 PM   Result Value Ref Range    Color DARK YELLOW      Appearance CLOUDY      Specific gravity >1.030 (H) 1.005 - 1.030    pH (UA) 5.5 5.0 - 8.0      Protein 100 (A) NEG mg/dL    Glucose NEGATIVE  NEG mg/dL    Ketone TRACE (A) NEG mg/dL    Bilirubin NEGATIVE  NEG      Blood TRACE (A) NEG      Urobilinogen 1.0 0.2 - 1.0 EU/dL    Nitrites NEGATIVE  NEG      Leukocyte Esterase NEGATIVE  NEG     DRUG SCREEN, URINE    Collection Time: 07/07/19  4:12 PM   Result Value Ref Range    BENZODIAZEPINES NEGATIVE  NEG      BARBITURATES NEGATIVE  NEG      THC (TH-CANNABINOL) POSITIVE (A) NEG      OPIATES POSITIVE (A) NEG      PCP(PHENCYCLIDINE) NEGATIVE  NEG      COCAINE NEGATIVE  NEG AMPHETAMINES NEGATIVE  NEG      METHADONE NEGATIVE  NEG      HDSCOM (NOTE)    URINE MICROSCOPIC ONLY    Collection Time: 07/07/19  4:12 PM   Result Value Ref Range    WBC 3 to 4 0 - 4 /hpf    RBC 4 to 5 0 - 5 /hpf    Epithelial cells FEW 0 - 5 /lpf    Bacteria 1+ (A) NEG /hpf    Mucus 3+ (A) NEG /lpf    CA Oxalate crystals 3+ (A) NEG    Hyaline cast 10 to 15 0 - 2 /lpf       Radiologic Studies -   CT HEAD WO CONT   Final Result   IMPRESSION:      No acute intracranial findings. XR CHEST PORT   Final Result   IMPRESSION:      No acute findings. Medical Decision Making   Provider Notes (Medical Decision Making):  MDM  Number of Diagnoses or Management Options  Opiate abuse, episodic [facility]):   Diagnosis management comments: Recreational drug abuse  Infection  Intracranial mass        I am the first provider for this patient. I reviewed the vital signs, available nursing notes, past medical history, past surgical history, family history and social history. Vital Signs-Reviewed the patient's vital signs. Records Reviewed: Nursing Notes (Time of Review: 4:03 PM)    ED Course: Progress Notes, Reevaluation, and Consults:    Labs essentially normal.  UDS positive for THC and opiates. Chest X-Ray showed No acute process. EKG showed sinus tachycardia with rate of 133 bpm. With no ST elevations or depression and non specific T wave changes. 5:03 PM 7/7/2019      Diagnosis       I have reassessed the patient. Patient is feeling well and would like to go home. Repeat heart rate 102. Patient was discharged in stable condition. Patient is to return to emergency department if any new or worsening condition. Clinical Impression:   1.  Opiate abuse, episodic ([facility] [address]        Disposition: Discharge home    Follow-up Information     Follow up With Specialties Details Why [address]  In 2 days  [facility] Attestation        Provider Attestation:     I personally performed the services described in the documentation, reviewed the documentation and it accurately and completely records my words and actions utilizing the [address] July 07, 2019 at 6:48 PM - Viviane Lockwood DO    Disclaimer. It is dictated using utilizing voice recognition software. Unfortunately this leads to occasional typographical errors. I apologize in advance if the situation occurs. If questions arise please do not hesitate to contact me or call our department.

## 2019-07-07 NOTE — ED TRIAGE NOTES
Pt arrived to ED via EMS for altered mental status. Girlfriend reports pt was shopping with her and after a hour she found him wandering in parking lot, diaphoretic. Pt is A&Ox4, restless. Denies drug use, states he worked out for 3 hours today.

## 2019-07-09 LAB
ATRIAL RATE: 133 BPM
CALCULATED P AXIS, ECG09: 74 DEGREES
CALCULATED R AXIS, ECG10: -45 DEGREES
CALCULATED T AXIS, ECG11: 59 DEGREES
DIAGNOSIS, 93000: NORMAL
P-R INTERVAL, ECG05: 144 MS
Q-T INTERVAL, ECG07: 286 MS
QRS DURATION, ECG06: 98 MS
QTC CALCULATION (BEZET), ECG08: 425 MS
VENTRICULAR RATE, ECG03: 133 BPM

## 2019-07-13 LAB
BACTERIA SPEC CULT: NORMAL
BACTERIA SPEC CULT: NORMAL
SERVICE CMNT-IMP: NORMAL
SERVICE CMNT-IMP: NORMAL

## 2019-07-17 ENCOUNTER — HOSPITAL ENCOUNTER (EMERGENCY)
Age: 44
Discharge: HOME | End: 2019-07-17
Attending: EMERGENCY MEDICINE
Payer: COMMERCIAL

## 2019-07-17 VITALS
DIASTOLIC BLOOD PRESSURE: 102 MMHG | HEART RATE: 145 BPM | TEMPERATURE: 98.8 F | SYSTOLIC BLOOD PRESSURE: 144 MMHG | RESPIRATION RATE: 16 BRPM | OXYGEN SATURATION: 95 %

## 2019-07-17 DIAGNOSIS — R55 SYNCOPE AND COLLAPSE: Primary | ICD-10-CM

## 2019-07-17 PROCEDURE — 99284 EMERGENCY DEPT VISIT MOD MDM: CPT

## 2019-07-18 NOTE — ED PROVIDER NOTES
ER08/08    44 y.o. WHITE OR  male    Presents to the ED with   Chief Complaint   Patient presents with    Drug Overdose         HPI: 9:19 PM  09:17PM 07-  This record was completed using an electronic medical record and dictation software. It may contain unintended unedited transcription errors. The 27-year-old male who presented to the emergency department for evaluation of a syncopal episode. \"The patient was found by EMS\" by report, did not give the patient any Narcan by report, they did not give the patient any Narcan prior to arrival in the emergency department. It is unclear what exactly transpired upon their arrival, they apparently gave the patient a brief episode of chest compressions, and assisted respirations. The patient states he was \"just dehydrated from working in the yard, and working out too hard\". He will not, or cannot, give me any more sophisticated Historical details.     Symptoms are constant, moderate, with no other relieving or exacerbating factors    ROS:  14 organ system review of systems is complete and is negative except as addressed in the HPI        Social History:   Social History     Socioeconomic History    Marital status: SINGLE     Spouse name: Not on file    Number of children: Not on file    Years of education: Not on file    Highest education level: Not on file   Occupational History    Not on file   Social Needs    Financial resource strain: Not on file    Food insecurity:     Worry: Not on file     Inability: Not on file    Transportation needs:     Medical: Not on file     Non-medical: Not on file   Tobacco Use    Smoking status: Not on file   Substance and Sexual Activity    Alcohol use: Not on file    Drug use: Not on file    Sexual activity: Not on file   Lifestyle    Physical activity:     Days per week: Not on file     Minutes per session: Not on file    Stress: Not on file   Relationships    Social connections:     Talks on phone: Not on file     Gets together: Not on file     Attends Sikh service: Not on file     Active member of club or organization: Not on file     Attends meetings of clubs or organizations: Not on file     Relationship status: Not on file    Intimate partner violence:     Fear of current or ex partner: Not on file     Emotionally abused: Not on file     Physically abused: Not on file     Forced sexual activity: Not on file   Other Topics Concern    Not on file   Social History Narrative    Not on file      has no tobacco history on file. Family History: No family history on file. Past Medical History: No past medical history on file. Past Surgical History: No past surgical history on file. Primary Care: None    Immunizations:     Medications: No current facility-administered medications for this encounter. No current outpatient medications on file. Allergies: No Known Allergies      Last Cath       Last Stress Test       Prior:ECHO               Physical Exam:  . Patient Vitals for the past 12 hrs:   Temp Pulse Resp BP SpO2   07/17/19 2032 98.8 °F (37.1 °C) (!) 145 16 (!) 144/102 95 %     Gen: Well developed, well nourished 44 y.o. male  HEENT: Normocephalic, atraumatic. No scleral icterus. Extraocular movements intact. .  Normal mucous membranes. Uvula midline. Airway widely patent. Respiratory: No accessory muscle use. No wheeze, No rales, No rhonchi. Normal chest wall excursion. No subcutaneous air, no rib crepitus  Cardiovascular: Regular rhythm and rate, Normal pulses, Normal perfusion. No edema. Gastrointestinal: Non distended, Non tender, No masses. No ascites. No organomegaly. No evidence of trauma  Musculoskeletal: Full range of motion at all other tested joints. No joint effusions. Neurological: Normal strength, Normal sensation. Normal speech. No ataxia. Cranial nerves II-XII normal as tested. Skin: No rash, petechia or purpura.  Warm and dry  Psychiatric: No suicidal ideation, No homicidal ideation. No hallucinations. Organized thoughts. Normal mood. normal affect. Heme: No lymphadenopathy. Genitourinary: Deferred    Orders:   No orders of the defined types were placed in this encounter. ECG: Refused  Current:      Comparison: .    Imaging: Refused  No results found. Labs:Refused  Labs Reviewed - No data to display    EMERGENCY DEPARTMENT COURSE    He refuses to answer most questions, refused blood work, urinalysis, imaging, or any other objective evaluation emergency department. I counseled him that this is a risky thing to do, may very well result in his death, and he voiced understanding of those risks. He states he \"just wants to go now\". He is high risk for outpatient management. We will have the patient sign out [address], and I counseled him that if he changes his mind about wanting further testing or treatment, he can return the emergency department anytime. Diagnosis:   1. Syncope and collapse          Disposition:  AMA      Discharge Medications: There are no discharge medications for this patient. Discharge Medications: There are no discharge medications for this patient. Referral:     Follow-up Information    None            (This chart was created with dictation software and an EHR.   It may contain unintended unedited historical and or dictation errors)

## 2019-07-18 NOTE — ED TRIAGE NOTES
Patient A/O x 3, brought into ED via [facility] medic for unintentional possible overdose. As per medic, patient was laying on stretcher, unresponsive with agonal respirations. Family members began to perform CPR and patient became, \"alert\". Medics deny patient receiving narcan. Patient is refusing all treatment at this time, denies drug use. Patient states, \"I put a lot of ADI in my water today and was outside, I don't take pain medicine or muscle relaxants or drugs\". As per medics, family members states patient abuses drugs. Patient is nodding during evaluation, denies any complaints.

## 2019-07-18 NOTE — ED NOTES
Patient standing up next to bed, made aware heart rate is 145 bpm. Patient refusing any interventions at this time. Patient states, \"I'm going to call [employer] to come and get me\". Patient made aware due to him being unstable, it is not safe for him to leave. Patient continues to refuse treatment.

## 2019-07-18 NOTE — DISCHARGE INSTRUCTIONS
Patient Education        Fainting: Care Instructions  Your Care Instructions    When you faint, or pass out, you lose consciousness for a short time. A brief drop in blood flow to the brain often causes it. When you fall or lie down, more blood flows to your brain and you regain consciousness. Emotional stress, pain, or overheating--especially if you have been standing--can make you faint. In these cases, fainting is usually not serious. But fainting can be a sign of a more serious problem. Your doctor may want you to have more tests to rule out other causes. The treatment you need depends on the reason why you fainted. The doctor has checked you carefully, but problems can develop later. If you notice any problems or new symptoms, get medical treatment right away. Follow-up care is a key part of your treatment and safety. Be sure to make and go to all appointments, and call your doctor if you are having problems. It's also a good idea to know your test results and keep a list of the medicines you take. How can you care for yourself at home? · Drink plenty of fluids to prevent dehydration. If you have kidney, heart, or liver disease and have to limit fluids, talk with your doctor before you increase your fluid intake. When should you call for help? Call 911 anytime you think you may need emergency care. For example, call if:    · You have symptoms of a heart problem. These may include:  ? Chest pain or pressure. ? Severe trouble breathing. ? A fast or irregular heartbeat. ? Lightheadedness or sudden weakness. ? Coughing up pink, foamy mucus. ? Passing out. After you call 911, the  may tell you to chew 1 adult-strength or 2 to 4 low-dose aspirin. Wait for an ambulance. Do not try to drive yourself.     · You have symptoms of a stroke. These may include:  ? Sudden numbness, tingling, weakness, or loss of movement in your face, arm, or leg, especially on only one side of your body.   ? Sudden vision changes. ? Sudden trouble speaking. ? Sudden confusion or trouble understanding simple statements. ? Sudden problems with walking or balance. ? A sudden, severe headache that is different from past headaches.     · You passed out (lost consciousness) again.    Watch closely for changes in your health, and be sure to contact your doctor if:    · You do not get better as expected. Where can you learn more? Go to http://alhaji-lashanda.info/. Enter U372 in the search box to learn more about \"Fainting: Care Instructions. \"  Current as of: September 23, 2018  Content Version: 11.9  © 0078-4940 [employer]. Care instructions adapted under license by [employer] (which disclaims liability or warranty for this information). If you have questions about a medical condition or this instruction, always ask your healthcare professional. [address] any warranty or liability for your use of this information.

## 2022-03-19 PROBLEM — N19 RENAL FAILURE: Status: ACTIVE | Noted: 2017-08-01

## 2022-03-19 PROBLEM — R00.0 TACHYCARDIA: Status: ACTIVE | Noted: 2017-08-01

## 2022-03-19 PROBLEM — T40.601A OVERDOSE OF OPIATE OR RELATED NARCOTIC (HCC): Status: ACTIVE | Noted: 2017-08-01

## 2023-01-30 ENCOUNTER — HOSPITAL ENCOUNTER (EMERGENCY)
Age: 48
Discharge: HOME | End: 2023-01-30
Attending: EMERGENCY MEDICINE
Payer: COMMERCIAL

## 2023-01-30 DIAGNOSIS — T40.2X1A OPIOID OVERDOSE, ACCIDENTAL OR UNINTENTIONAL, INITIAL ENCOUNTER (HCC): Primary | ICD-10-CM

## 2023-01-30 LAB
ALBUMIN SERPL-MCNC: 3.9 G/DL (ref 3.4–5)
ALBUMIN/GLOB SERPL: 1.1 (ref 0.8–1.7)
ALP SERPL-CCNC: 59 U/L (ref 45–117)
ALT SERPL-CCNC: 78 U/L (ref 16–61)
ANION GAP SERPL CALC-SCNC: 3 MMOL/L (ref 3–18)
AST SERPL-CCNC: 44 U/L (ref 10–38)
ATRIAL RATE: 150 BPM
BASOPHILS # BLD: 0 K/UL (ref 0–0.1)
BASOPHILS NFR BLD: 0 % (ref 0–2)
BILIRUB SERPL-MCNC: 0.2 MG/DL (ref 0.2–1)
BUN SERPL-MCNC: 18 MG/DL (ref 7–18)
BUN/CREAT SERPL: 15 (ref 12–20)
CALCIUM SERPL-MCNC: 9.1 MG/DL (ref 8.5–10.1)
CALCULATED P AXIS, ECG09: 70 DEGREES
CALCULATED R AXIS, ECG10: -49 DEGREES
CALCULATED T AXIS, ECG11: 71 DEGREES
CHLORIDE SERPL-SCNC: 108 MMOL/L (ref 100–111)
CO2 SERPL-SCNC: 26 MMOL/L (ref 21–32)
CREAT SERPL-MCNC: 1.18 MG/DL (ref 0.6–1.3)
DIAGNOSIS, 93000: NORMAL
DIFFERENTIAL METHOD BLD: ABNORMAL
EOSINOPHIL # BLD: 0.1 K/UL (ref 0–0.4)
EOSINOPHIL NFR BLD: 1 % (ref 0–5)
ERYTHROCYTE [DISTWIDTH] IN BLOOD BY AUTOMATED COUNT: 12.4 % (ref 11.6–14.5)
GLOBULIN SER CALC-MCNC: 3.6 G/DL (ref 2–4)
GLUCOSE SERPL-MCNC: 119 MG/DL (ref 74–99)
HCT VFR BLD AUTO: 40 % (ref 36–48)
HGB BLD-MCNC: 13.1 G/DL (ref 13–16)
IMM GRANULOCYTES # BLD AUTO: 0 K/UL (ref 0–0.04)
IMM GRANULOCYTES NFR BLD AUTO: 0 % (ref 0–0.5)
LYMPHOCYTES # BLD: 1.5 K/UL (ref 0.9–3.6)
LYMPHOCYTES NFR BLD: 16 % (ref 21–52)
MCH RBC QN AUTO: 27.8 PG (ref 24–34)
MCHC RBC AUTO-ENTMCNC: 32.8 G/DL (ref 31–37)
MCV RBC AUTO: 84.7 FL (ref 78–100)
MONOCYTES # BLD: 0.6 K/UL (ref 0.05–1.2)
MONOCYTES NFR BLD: 6 % (ref 3–10)
NEUTS SEG # BLD: 6.9 K/UL (ref 1.8–8)
NEUTS SEG NFR BLD: 76 % (ref 40–73)
NRBC # BLD: 0 K/UL (ref 0–0.01)
NRBC BLD-RTO: 0 PER 100 WBC
P-R INTERVAL, ECG05: 116 MS
PLATELET # BLD AUTO: 221 K/UL (ref 135–420)
PMV BLD AUTO: 9.7 FL (ref 9.2–11.8)
POTASSIUM SERPL-SCNC: 3.6 MMOL/L (ref 3.5–5.5)
PROT SERPL-MCNC: 7.5 G/DL (ref 6.4–8.2)
Q-T INTERVAL, ECG07: 288 MS
QRS DURATION, ECG06: 100 MS
QTC CALCULATION (BEZET), ECG08: 455 MS
RBC # BLD AUTO: 4.72 M/UL (ref 4.35–5.65)
SODIUM SERPL-SCNC: 137 MMOL/L (ref 136–145)
VENTRICULAR RATE, ECG03: 150 BPM
WBC # BLD AUTO: 9.1 K/UL (ref 4.6–13.2)

## 2023-01-30 PROCEDURE — 99284 EMERGENCY DEPT VISIT MOD MDM: CPT

## 2023-01-30 PROCEDURE — 85025 COMPLETE CBC W/AUTO DIFF WBC: CPT

## 2023-01-30 PROCEDURE — 80053 COMPREHEN METABOLIC PANEL: CPT

## 2023-01-30 PROCEDURE — 93005 ELECTROCARDIOGRAM TRACING: CPT

## 2023-01-30 PROCEDURE — 82540 ASSAY OF CREATINE: CPT

## 2023-01-30 RX ORDER — NALOXONE HYDROCHLORIDE 4 MG/.1ML
SPRAY NASAL
Qty: 2 EACH | Refills: 0 | Status: SHIPPED | OUTPATIENT
Start: 2023-01-30

## 2023-01-30 RX ORDER — NALOXONE HYDROCHLORIDE 1 MG/ML
2 INJECTION INTRAMUSCULAR; INTRAVENOUS; SUBCUTANEOUS ONCE
Status: DISCONTINUED | OUTPATIENT
Start: 2023-01-30 | End: 2023-01-30 | Stop reason: HOSPADM

## 2023-01-30 NOTE — ED PROVIDER NOTES
EMERGENCY DEPARTMENT HISTORY AND PHYSICAL EXAM    Date: 1/30/2023  Patient Name: Jannie Crockett    History of Presenting Illness     No chief complaint on file. History Provided By: Patient and EMS        Additional History (Context): Jannie Crockett is a 56 y.o. male with No significant past medical history who presents with unintentional overdose today. Patient was found outside of a convenience store in his car. He told EMS he had been just taking creatine after workout. Denies anything other than marijuana. Has never overdosed previously. PCP: None    Current Facility-Administered Medications   Medication Dose Route Frequency Provider Last Rate Last Admin    sodium chloride 0.9 % bolus infusion 1,000 mL  1,000 mL IntraVENous ONCE Edie Larsen PA        naloxone (NARCAN) injection 2 mg  2 mg IntraMUSCular ONCE Edie Larsen PA         Current Outpatient Medications   Medication Sig Dispense Refill    naloxone (Narcan) 4 mg/actuation nasal spray Use 1 spray intranasally, then discard. Repeat with new spray every 2 min as needed for opioid overdose symptoms, alternating nostrils. 2 Each 0       Past History     Past Medical History:  No past medical history on file. Past Surgical History:  No past surgical history on file. Family History:  No family history on file. Social History: Allergies:  No Known Allergies      Review of Systems   Review of Systems   Constitutional:  Positive for diaphoresis. HENT: Negative. Eyes: Negative. Respiratory:  Positive for apnea and shortness of breath. Cardiovascular: Negative. Gastrointestinal: Negative. Endocrine: Negative. Genitourinary: Negative. Musculoskeletal: Negative. Skin: Negative. Allergic/Immunologic: Negative. Neurological: Negative. Hematological: Negative. Psychiatric/Behavioral:  Positive for confusion.     All Other Systems Negative  Physical Exam   There were no vitals filed for this visit.  Physical Exam  Vitals and nursing note reviewed. Constitutional:       General: He is not in acute distress. Appearance: He is well-developed. He is diaphoretic. He is not ill-appearing or toxic-appearing. HENT:      Head: Normocephalic and atraumatic. Neck:      Thyroid: No thyromegaly. Vascular: No carotid bruit. Trachea: No tracheal deviation. Cardiovascular:      Rate and Rhythm: Normal rate and regular rhythm. Heart sounds: Normal heart sounds. No murmur heard. No friction rub. No gallop. Pulmonary:      Effort: No respiratory distress. Breath sounds: Normal breath sounds. No stridor. No wheezing or rales. Comments: Decreased breath sounds  Chest:      Chest wall: No tenderness. Abdominal:      General: There is no distension. Palpations: Abdomen is soft. There is no mass. Tenderness: There is no abdominal tenderness. There is no guarding or rebound. Musculoskeletal:         General: Normal range of motion. Cervical back: Normal range of motion and neck supple. Skin:     General: Skin is warm. Coloration: Skin is not pale. Neurological:      Mental Status: He is alert and oriented to person, place, and time. Psychiatric:         Speech: Speech normal.         Behavior: Behavior normal.         Thought Content:  Thought content normal.         Judgment: Judgment normal.          Diagnostic Study Results     Labs -     Recent Results (from the past 12 hour(s))   METABOLIC PANEL, COMPREHENSIVE    Collection Time: 01/30/23 12:30 PM   Result Value Ref Range    Sodium 137 136 - 145 mmol/L    Potassium 3.6 3.5 - 5.5 mmol/L    Chloride 108 100 - 111 mmol/L    CO2 26 21 - 32 mmol/L    Anion gap 3 3.0 - 18 mmol/L    Glucose 119 (H) 74 - 99 mg/dL    BUN 18 7.0 - 18 MG/DL    Creatinine 1.18 0.6 - 1.3 MG/DL    BUN/Creatinine ratio 15 12 - 20      eGFR >60 >60 ml/min/1.73m2    Calcium 9.1 8.5 - 10.1 MG/DL    Bilirubin, total 0.2 0.2 - 1.0 MG/DL ALT (SGPT) 78 (H) 16 - 61 U/L    AST (SGOT) 44 (H) 10 - 38 U/L    Alk. phosphatase 59 45 - 117 U/L    Protein, total 7.5 6.4 - 8.2 g/dL    Albumin 3.9 3.4 - 5.0 g/dL    Globulin 3.6 2.0 - 4.0 g/dL    A-G Ratio 1.1 0.8 - 1.7         Radiologic Studies -   No orders to display     CT Results  (Last 48 hours)      None          CXR Results  (Last 48 hours)      None              Medical Decision Making   I am the first provider for this patient. I reviewed the vital signs, available nursing notes, past medical history, past surgical history, family history and social history. Vital Signs-Reviewed the patient's vital signs. Records Reviewed: None    Procedures:  Procedures    Provider Notes (Medical Decision Making): Patient argumentative but barely arousable and given Narcan and responded easily. Have tried to persuade him to stay. He wants to go look for his vehicle. He says he has to go to work this afternoon and cannot stay. He was advised that he would stop breathing and die if he did not stay for observation. He was given a prescription for Narcan which he understands should be used for any decreased ability to breathe. Told him he would himself need to make sure that someone else gave it to him as he would be unable to do it himself. MED RECONCILIATION:  Current Facility-Administered Medications   Medication Dose Route Frequency    sodium chloride 0.9 % bolus infusion 1,000 mL  1,000 mL IntraVENous ONCE    naloxone (NARCAN) injection 2 mg  2 mg IntraMUSCular ONCE     Current Outpatient Medications   Medication Sig    naloxone (Narcan) 4 mg/actuation nasal spray Use 1 spray intranasally, then discard. Repeat with new spray every 2 min as needed for opioid overdose symptoms, alternating nostrils. Disposition:  discharged    DISCHARGE NOTE:   1:01 PM    Pt has been reexamined. Patient has no new complaints, changes, or physical findings. Care plan outlined and precautions discussed. Results of exam were reviewed with the patient. All medications were reviewed with the patient; will d/c home with emilia. All of pt's questions and concerns were addressed. Patient was instructed and agrees to follow up with [facility], as well as to return to the ED upon further deterioration. Patient is ready to go home. Follow-up Information       Follow up With Specialties Details Why [address] [place] [address]  Schedule an appointment as soon as possible for a visit today  [address] [place] [facility] EMERGENCY DEPT Emergency Medicine  If symptoms worsen return immediately [address] [facility] [address]  [phone]            Current Discharge Medication List        START taking these medications    Details   naloxone (Narcan) 4 mg/actuation nasal spray Use 1 spray intranasally, then discard. Repeat with new spray every 2 min as needed for opioid overdose symptoms, alternating nostrils. Qty: 2 Each, Refills: 0  Start date: 1/30/2023               Core Measures:    Critical Care Time:   Critical Care Time:   I have spent 35 minutes of critical care time involved in lab review, consultations with specialist, family decision-making, and documentation. During this entire length of time I was immediately available to the patient. Critical Care: The reason for providing this level of medical care for this critically ill patient was due a critical illness that impaired one or more vital organ systems such that there was a high probability of imminent or life threatening deterioration in the patients condition. This care involved high complexity decision making to assess, manipulate, and support vital system functions, to treat this degreee vital organ system failure and to prevent further life threatening deterioration of the patients condition. Critical care time exclusive of any billable procedures.     Diagnosis     Clinical Impression: 1. Opioid overdose, accidental or unintentional, initial encounter ([facility] [address]

## 2023-02-02 LAB — CREATINE SERPL-MCNC: 0.3 MG/DL (ref 0–0.7)

## 2023-02-24 ENCOUNTER — OFFICE VISIT (OUTPATIENT)
Age: 48
End: 2023-02-24
Payer: COMMERCIAL

## 2023-02-24 ENCOUNTER — HOSPITAL ENCOUNTER (OUTPATIENT)
Facility: HOSPITAL | Age: 48
Setting detail: SPECIMEN
End: 2023-02-24
Payer: COMMERCIAL

## 2023-02-24 VITALS
RESPIRATION RATE: 18 BRPM | OXYGEN SATURATION: 99 % | BODY MASS INDEX: 29.96 KG/M2 | TEMPERATURE: 98.7 F | HEIGHT: 71 IN | HEART RATE: 71 BPM | DIASTOLIC BLOOD PRESSURE: 89 MMHG | SYSTOLIC BLOOD PRESSURE: 124 MMHG | WEIGHT: 214 LBS

## 2023-02-24 DIAGNOSIS — Z13.0 SCREENING FOR ENDOCRINE, METABOLIC AND IMMUNITY DISORDER: ICD-10-CM

## 2023-02-24 DIAGNOSIS — Z12.11 SCREEN FOR COLON CANCER: Primary | ICD-10-CM

## 2023-02-24 DIAGNOSIS — Z12.5 SCREENING FOR PROSTATE CANCER: ICD-10-CM

## 2023-02-24 DIAGNOSIS — Z13.228 SCREENING FOR ENDOCRINE, METABOLIC AND IMMUNITY DISORDER: ICD-10-CM

## 2023-02-24 DIAGNOSIS — Z13.29 SCREENING FOR ENDOCRINE, METABOLIC AND IMMUNITY DISORDER: ICD-10-CM

## 2023-02-24 DIAGNOSIS — R76.8 HEPATITIS C ANTIBODY TEST POSITIVE: ICD-10-CM

## 2023-02-24 DIAGNOSIS — Z00.00 HEALTHCARE MAINTENANCE: ICD-10-CM

## 2023-02-24 LAB
ALBUMIN SERPL-MCNC: 3.9 G/DL (ref 3.4–5)
ALBUMIN/GLOB SERPL: 1.1 (ref 0.8–1.7)
ALP SERPL-CCNC: 69 U/L (ref 45–117)
ALT SERPL-CCNC: 74 U/L (ref 16–61)
ANION GAP SERPL CALC-SCNC: 5 MMOL/L (ref 3–18)
AST SERPL-CCNC: 37 U/L (ref 10–38)
BASOPHILS # BLD: 0 K/UL (ref 0–0.1)
BASOPHILS NFR BLD: 1 % (ref 0–2)
BILIRUB SERPL-MCNC: 0.4 MG/DL (ref 0.2–1)
BUN SERPL-MCNC: 17 MG/DL (ref 7–18)
BUN/CREAT SERPL: 17 (ref 12–20)
CALCIUM SERPL-MCNC: 9.3 MG/DL (ref 8.5–10.1)
CHLORIDE SERPL-SCNC: 106 MMOL/L (ref 100–111)
CHOLEST SERPL-MCNC: 168 MG/DL
CO2 SERPL-SCNC: 27 MMOL/L (ref 21–32)
CREAT SERPL-MCNC: 1.03 MG/DL (ref 0.6–1.3)
DIFFERENTIAL METHOD BLD: ABNORMAL
EOSINOPHIL # BLD: 0.1 K/UL (ref 0–0.4)
EOSINOPHIL NFR BLD: 1 % (ref 0–5)
ERYTHROCYTE [DISTWIDTH] IN BLOOD BY AUTOMATED COUNT: 12.8 % (ref 11.6–14.5)
EST. AVERAGE GLUCOSE BLD GHB EST-MCNC: 105 MG/DL
GLOBULIN SER CALC-MCNC: 3.7 G/DL (ref 2–4)
GLUCOSE SERPL-MCNC: 96 MG/DL (ref 74–99)
HBA1C MFR BLD: 5.3 % (ref 4.2–5.6)
HCT VFR BLD AUTO: 42.9 % (ref 36–48)
HDLC SERPL-MCNC: 69 MG/DL (ref 40–60)
HDLC SERPL: 2.4 (ref 0–5)
HGB BLD-MCNC: 13.6 G/DL (ref 13–16)
IMM GRANULOCYTES # BLD AUTO: 0 K/UL (ref 0–0.04)
IMM GRANULOCYTES NFR BLD AUTO: 0 % (ref 0–0.5)
LDLC SERPL CALC-MCNC: 82 MG/DL (ref 0–100)
LIPID PANEL: ABNORMAL
LYMPHOCYTES # BLD: 1.6 K/UL (ref 0.9–3.6)
LYMPHOCYTES NFR BLD: 20 % (ref 21–52)
MCH RBC QN AUTO: 27.6 PG (ref 24–34)
MCHC RBC AUTO-ENTMCNC: 31.7 G/DL (ref 31–37)
MCV RBC AUTO: 87 FL (ref 78–100)
MONOCYTES # BLD: 0.6 K/UL (ref 0.05–1.2)
MONOCYTES NFR BLD: 7 % (ref 3–10)
NEUTS SEG # BLD: 5.8 K/UL (ref 1.8–8)
NEUTS SEG NFR BLD: 72 % (ref 40–73)
NRBC # BLD: 0 K/UL (ref 0–0.01)
NRBC BLD-RTO: 0 PER 100 WBC
PLATELET # BLD AUTO: 275 K/UL (ref 135–420)
PMV BLD AUTO: 9.8 FL (ref 9.2–11.8)
POTASSIUM SERPL-SCNC: 4.1 MMOL/L (ref 3.5–5.5)
PROT SERPL-MCNC: 7.6 G/DL (ref 6.4–8.2)
PSA SERPL-MCNC: 1 NG/ML (ref 0–4)
RBC # BLD AUTO: 4.93 M/UL (ref 4.35–5.65)
SODIUM SERPL-SCNC: 138 MMOL/L (ref 136–145)
TRIGL SERPL-MCNC: 85 MG/DL
VLDLC SERPL CALC-MCNC: 17 MG/DL
WBC # BLD AUTO: 8.2 K/UL (ref 4.6–13.2)

## 2023-02-24 PROCEDURE — 86803 HEPATITIS C AB TEST: CPT

## 2023-02-24 PROCEDURE — 80061 LIPID PANEL: CPT

## 2023-02-24 PROCEDURE — 83036 HEMOGLOBIN GLYCOSYLATED A1C: CPT

## 2023-02-24 PROCEDURE — 87389 HIV-1 AG W/HIV-1&-2 AB AG IA: CPT

## 2023-02-24 PROCEDURE — 87522 HEPATITIS C REVRS TRNSCRPJ: CPT

## 2023-02-24 PROCEDURE — 99386 PREV VISIT NEW AGE 40-64: CPT | Performed by: INTERNAL MEDICINE

## 2023-02-24 PROCEDURE — 36415 COLL VENOUS BLD VENIPUNCTURE: CPT

## 2023-02-24 PROCEDURE — 84153 ASSAY OF PSA TOTAL: CPT

## 2023-02-24 PROCEDURE — 85025 COMPLETE CBC W/AUTO DIFF WBC: CPT

## 2023-02-24 PROCEDURE — 80053 COMPREHEN METABOLIC PANEL: CPT

## 2023-02-24 SDOH — ECONOMIC STABILITY: INCOME INSECURITY: HOW HARD IS IT FOR YOU TO PAY FOR THE VERY BASICS LIKE FOOD, HOUSING, MEDICAL CARE, AND HEATING?: PATIENT DECLINED

## 2023-02-24 SDOH — ECONOMIC STABILITY: FOOD INSECURITY: WITHIN THE PAST 12 MONTHS, THE FOOD YOU BOUGHT JUST DIDN'T LAST AND YOU DIDN'T HAVE MONEY TO GET MORE.: PATIENT DECLINED

## 2023-02-24 SDOH — ECONOMIC STABILITY: FOOD INSECURITY: WITHIN THE PAST 12 MONTHS, YOU WORRIED THAT YOUR FOOD WOULD RUN OUT BEFORE YOU GOT MONEY TO BUY MORE.: PATIENT DECLINED

## 2023-02-24 SDOH — ECONOMIC STABILITY: HOUSING INSECURITY
IN THE LAST 12 MONTHS, WAS THERE A TIME WHEN YOU DID NOT HAVE A STEADY PLACE TO SLEEP OR SLEPT IN A SHELTER (INCLUDING NOW)?: PATIENT REFUSED

## 2023-02-24 ASSESSMENT — PATIENT HEALTH QUESTIONNAIRE - PHQ9
SUM OF ALL RESPONSES TO PHQ9 QUESTIONS 1 & 2: 0
SUM OF ALL RESPONSES TO PHQ QUESTIONS 1-9: 0
1. LITTLE INTEREST OR PLEASURE IN DOING THINGS: 0
2. FEELING DOWN, DEPRESSED OR HOPELESS: 0
SUM OF ALL RESPONSES TO PHQ QUESTIONS 1-9: 0

## 2023-02-24 ASSESSMENT — ENCOUNTER SYMPTOMS
EYES NEGATIVE: 1
RESPIRATORY NEGATIVE: 1
ALLERGIC/IMMUNOLOGIC NEGATIVE: 1
GASTROINTESTINAL NEGATIVE: 1

## 2023-02-24 NOTE — PROGRESS NOTES
Subjective:   Nacho Knox was seen today for New Patient    Patient has no symptoms suggestive of cardiac/pulmonary/GI/ conditions. Patient does not take any medications. 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 Patient does not smoke. Takes about a glass of wine on and off. No drugs. Patient exercises regularly 2 hours a day. Patient is vaccinated with 2 series injections of COVID-vaccine, no booster. Patient has not had flu vaccine. Patient had Tdap few years ago. Patient has strong family history of ovarian CA in mother, mesothelioma in maternal grandfather. Patient's father recently  of the complications of Alzheimer's disease. Patient has history of low back pain in the past, had scans done which showed mild disc herniation at L4-L5. It does not bother him anymore. Patient wants to get screened for prostate and colon CA. Past Medical History:   Diagnosis Date    Herniation of intervertebral disc between L4 and L5        Past Surgical History:   Procedure Laterality Date    LYMPH NODE BIOPSY         Family History   Problem Relation Age of Onset    Kidney Disease Mother     Ovarian Cancer Mother     Alzheimer's Disease Father        Social History     Socioeconomic History    Marital status: Single     Spouse name: Not on file    Number of children: Not on file    Years of education: Not on file    Highest education level: Not on file   Occupational History    Not on file   Tobacco Use    Smoking status: Never    Smokeless tobacco: Never   Vaping Use    Vaping Use: Never used   Substance and Sexual Activity    Alcohol use:  Yes     Alcohol/week: 1.0 standard drink     Types: 1 Glasses of wine per week    Drug use: Never    Sexual activity: Not Currently     Partners: Female   Other Topics Concern    Not on file   Social History Narrative    Not on file     Social Determinants of Health     Financial Resource Strain: Unknown    Difficulty of Paying Living Expenses: Patient refused   Food Insecurity: Unknown    Worried About Running Out of Food in the Last Year: Patient refused    [address] in the Last Year: Patient refused   Transportation Needs: Unknown    Lack of Transportation (Medical): Not on file    Lack of Transportation (Non-Medical): Patient refused   Physical Activity: Not on file   Stress: Not on file   Social Connections: Not on file   Intimate Partner Violence: Not on file   Housing Stability: Unknown    Unable to Pay for Housing in the Last Year: Not on file    Number of [place] in the Last Year: Not on file    Unstable Housing in the Last Year: Patient refused       No Known Allergies    No current outpatient medications on file prior to visit. No current facility-administered medications on file prior to visit. Review of Systems   Constitutional: Negative. Negative for activity change. HENT: Negative. Eyes: Negative. Respiratory: Negative. Cardiovascular: Negative. Gastrointestinal: Negative. Endocrine: Negative. Genitourinary: Negative. Musculoskeletal: Negative. Skin: Negative. Allergic/Immunologic: Negative. Neurological: Negative. Hematological: Negative. Psychiatric/Behavioral: Negative. Objective:     Vitals:    02/24/23 0803 02/24/23 0804   BP: (!) 144/92 124/89   Pulse: 71    Resp: 18    Temp: 98.7 °F (37.1 °C)    TempSrc: Temporal    SpO2: 99%    Weight: 214 lb (97.1 kg)    Height: 5' 11\" (1.803 m)       Physical Exam  Constitutional:       Appearance: Normal appearance. HENT:      Head: Normocephalic and atraumatic. Nose: Nose normal.      Mouth/Throat:      Mouth: Mucous membranes are moist.   Eyes:      Extraocular Movements: Extraocular movements intact. Pupils: Pupils are equal, round, and reactive to light. Cardiovascular:      Rate and Rhythm: Normal rate and regular rhythm. Pulses: Normal pulses. Heart sounds: Normal heart sounds. Pulmonary:      Effort: Pulmonary effort is normal.      Breath sounds: Normal breath sounds. Abdominal:      General: Abdomen is flat. Palpations: Abdomen is soft. Musculoskeletal:         General: Normal range of motion. Cervical back: Normal range of motion and neck supple. Skin:     General: Skin is warm. Neurological:      General: No focal deficit present. Mental Status: He is alert. Mental status is at baseline. Psychiatric:         Mood and Affect: Mood normal.         Behavior: Behavior normal.          Labs:     No results found for this visit on 02/24/23. Active Problems:     Patient Active Problem List   Diagnosis    Tachycardia    Overdose of opiate or related narcotic [facility])    Renal failure    Screen for colon cancer    Healthcare maintenance    Screening for endocrine, metabolic and immunity disorder    Screening for prostate cancer         Assessment & Plan:     1. Screen for colon cancer  -     Ambulatory referral to Gastroenterology  2. Healthcare maintenance  Assessment & Plan:   Patient not inclined to get COVID booster and flu vaccine. Orders:  -     Hepatitis C Ab, Rflx to Qt by PCR; Future  -     HIV Screen; Future  3. Screening for endocrine, metabolic and immunity disorder  -     Lipid Panel; Future  -     Comprehensive Metabolic Panel; Future  -     CBC with Auto Differential; Future  -     Hemoglobin A1C; Future  4. Screening for prostate cancer  -     PSA Screening; Future         Follow-up and Dispositions    Return in about 1 year (around 2/24/2024) for wellness, LABS TODAY. Disclaimer: The patient understands our medical plan. Alternatives have been explained and offered. The risks, benefits and significant side effects of all medications have been reviewed. Anticipated time course and progression of condition reviewed. All questions have been addressed.   He is encouraged to employ the information provided in the after visit summary, which was reviewed. Where applicable, he is instructed to call the clinic if he has not been notified either by phone or through [address] with the results of his tests or with an appointment plan for any referrals within 1 week(s). No news is not good news; it's no news. The patient  is to call if his condition worsens or fails to improve or if significant side effects are experienced.            Serene Enamorado MD

## 2023-02-25 LAB
HCV IGG SERPL QL IA: REACTIVE
HCV RNA SERPL NAA+PROBE-ACNC: NORMAL IU/ML
HCV RNA SERPL NAA+PROBE-LOG IU: NORMAL LOG10 IU/ML
INTERPRETATION: NORMAL
REF LAB TEST REF RANGE: NORMAL

## 2023-02-27 ENCOUNTER — TELEPHONE (OUTPATIENT)
Age: 48
End: 2023-02-27

## 2023-02-27 LAB
HCV IGG SERPL QL IA: REACTIVE
HCV RNA SERPL NAA+PROBE-ACNC: NORMAL IU/ML
HCV RNA SERPL NAA+PROBE-LOG IU: 4.88 LOG10 IU/ML
HIV 1+2 AB+HIV1 P24 AG SERPL QL IA: NONREACTIVE
HIV 1/2 RESULT COMMENT: NORMAL
INTERPRETATION: NORMAL
REF LAB TEST REF RANGE: NORMAL

## 2023-02-27 NOTE — TELEPHONE ENCOUNTER
----- Message from Marco Bales MD sent at 2/27/2023  7:15 AM EST -----  Please inform the patient that CBC, lipid profile, PSA, kidney function are within acceptable range. Hepatitis C antibody is positive. Liver enzyme mildly elevated  Referral sent for GI and infectious disease.   Awaiting hep C quantitative and HIV test.    Thanks

## 2023-03-10 ENCOUNTER — TELEPHONE (OUTPATIENT)
Age: 48
End: 2023-03-10

## 2023-03-10 NOTE — TELEPHONE ENCOUNTER
Lynn Mccann with [facility] Dept of Infectious Disease called and states that patient contacted them to schedule an appt ;however, they didn't receive the referral that was done on 2/27/23. They are asking for that and the relevant office notes to be faxed to [phone].   Please advise, thank you

## 2023-03-14 ENCOUNTER — TELEPHONE (OUTPATIENT)
Age: 48
End: 2023-03-14

## 2023-03-15 NOTE — TELEPHONE ENCOUNTER
Pt returned call. He is aware nurse Terra Brown will call him back shortly.     He may be reached at [phone]

## 2023-03-15 NOTE — TELEPHONE ENCOUNTER
I spoke with the patient, reviewed his lab results and provided him with the number for GI. Patient has an appointment with ID next week.

## 2023-03-23 ENCOUNTER — TELEPHONE (OUTPATIENT)
Facility: HOSPITAL | Age: 48
End: 2023-03-23

## 2023-03-23 NOTE — TELEPHONE ENCOUNTER
Patient had appointment with me for Hepatitis C management on 3/23/23 at 10:30. Patient did not show for his appointment. My office will call him to re-schedule.

## 2023-03-26 PROBLEM — Z12.11 SCREEN FOR COLON CANCER: Status: RESOLVED | Noted: 2023-02-24 | Resolved: 2023-03-26

## 2023-03-26 PROBLEM — Z13.0 SCREENING FOR ENDOCRINE, METABOLIC AND IMMUNITY DISORDER: Status: RESOLVED | Noted: 2023-02-24 | Resolved: 2023-03-26

## 2023-03-26 PROBLEM — Z13.228 SCREENING FOR ENDOCRINE, METABOLIC AND IMMUNITY DISORDER: Status: RESOLVED | Noted: 2023-02-24 | Resolved: 2023-03-26

## 2023-03-26 PROBLEM — Z12.5 SCREENING FOR PROSTATE CANCER: Status: RESOLVED | Noted: 2023-02-24 | Resolved: 2023-03-26

## 2023-03-26 PROBLEM — Z13.29 SCREENING FOR ENDOCRINE, METABOLIC AND IMMUNITY DISORDER: Status: RESOLVED | Noted: 2023-02-24 | Resolved: 2023-03-26

## 2023-03-26 PROBLEM — Z00.00 HEALTHCARE MAINTENANCE: Status: RESOLVED | Noted: 2023-02-24 | Resolved: 2023-03-26

## 2023-04-17 ENCOUNTER — HOSPITAL ENCOUNTER (OUTPATIENT)
Facility: HOSPITAL | Age: 48
Discharge: HOME | End: 2023-04-20
Payer: COMMERCIAL

## 2023-04-17 DIAGNOSIS — B18.2 CHRONIC HEPATITIS C WITHOUT HEPATIC COMA (HCC): ICD-10-CM

## 2023-04-17 DIAGNOSIS — Z12.11 COLON CANCER SCREENING: ICD-10-CM

## 2023-04-17 PROCEDURE — 76705 ECHO EXAM OF ABDOMEN: CPT
